# Patient Record
Sex: MALE | Race: WHITE | Employment: FULL TIME | URBAN - METROPOLITAN AREA
[De-identification: names, ages, dates, MRNs, and addresses within clinical notes are randomized per-mention and may not be internally consistent; named-entity substitution may affect disease eponyms.]

---

## 2023-12-10 ENCOUNTER — HOSPITAL ENCOUNTER (OUTPATIENT)
Age: 58
Discharge: HOME | End: 2023-12-10
Payer: COMMERCIAL

## 2023-12-10 VITALS
RESPIRATION RATE: 20 BRPM | DIASTOLIC BLOOD PRESSURE: 78 MMHG | TEMPERATURE: 101 F | SYSTOLIC BLOOD PRESSURE: 121 MMHG | HEART RATE: 117 BPM | OXYGEN SATURATION: 99 %

## 2023-12-10 DIAGNOSIS — Z11.52 ENCOUNTER FOR SCREENING FOR COVID-19: ICD-10-CM

## 2023-12-10 DIAGNOSIS — R05.1 ACUTE COUGH: ICD-10-CM

## 2023-12-10 DIAGNOSIS — U07.1 COVID: Primary | ICD-10-CM

## 2023-12-10 LAB
POCT INFLUENZA A: NEGATIVE
POCT INFLUENZA B: NEGATIVE
SARS-COV-2 RNA RESP QL NAA+PROBE: DETECTED

## 2023-12-10 RX ORDER — IBUPROFEN 600 MG/1
600 TABLET ORAL ONCE
Status: COMPLETED | OUTPATIENT
Start: 2023-12-10 | End: 2023-12-10

## 2023-12-17 ENCOUNTER — HOSPITAL ENCOUNTER (OUTPATIENT)
Age: 58
Discharge: HOME | End: 2023-12-17
Payer: COMMERCIAL

## 2023-12-17 VITALS
OXYGEN SATURATION: 99 % | RESPIRATION RATE: 16 BRPM | SYSTOLIC BLOOD PRESSURE: 140 MMHG | TEMPERATURE: 97 F | HEART RATE: 89 BPM | DIASTOLIC BLOOD PRESSURE: 95 MMHG

## 2023-12-17 DIAGNOSIS — U07.1 COVID-19: ICD-10-CM

## 2023-12-17 DIAGNOSIS — Z11.52 ENCOUNTER FOR SCREENING LABORATORY TESTING FOR COVID-19 VIRUS: Primary | ICD-10-CM

## 2023-12-17 LAB — SARS-COV-2 RNA RESP QL NAA+PROBE: DETECTED

## 2023-12-17 PROCEDURE — U0002 COVID-19 LAB TEST NON-CDC: HCPCS | Performed by: NURSE PRACTITIONER

## 2023-12-17 PROCEDURE — 99213 OFFICE O/P EST LOW 20 MIN: CPT | Performed by: NURSE PRACTITIONER

## 2023-12-17 NOTE — DISCHARGE INSTRUCTIONS
FOLLOW UP WITH YOUR PRIMARY CARE PROVIDER As needed.   -SELF QUARANTINE AND STAY AT HOME UNTIL YOUR SYMPTOMS RESOLVE   CDC recommends 5 days of Quarantine (12/16) then 5 days of masking from positive test (until 12/22)    -[address] YOUR HANDS OFTEN, DISINFECT COMMON SURFACES AROUND HOME THAT OTHERS USE  -COVER YOUR COUGH AND WEAR A MASK!    -DRINK PLENTY OF WATER, GATORADE, EAT SMALL MEALS, WELL BALANCED FOODS  -TAKE TYLENOL 650MG-1000MG TABLET BY MOUTH EVERY 6 HOURS AS NEEDED FOR BODYACHES, PAIN/FEVER > 100.4

## (undated) NOTE — LETTER
Date & Time: 12/17/2023, 8:49 AM  Patient: Sherri Rose  Encounter Provider(s):    DAVIN Rico       To Whom It May Concern:    Sofie Elizabeth was seen and treated in our department on 12/17/2023. He should not return to work until 12/18/2023 and mask for 5 days, until 12/22/2023 .     If you have any questions or concerns, please do not hesitate to call.        _____________________________  Physician/[facility] Signature

## (undated) NOTE — LETTER
Date & Time: 12/10/2023, 8:35 AM  Patient: Stella Montanez  Encounter Provider(s):    Elly Mejia       To Whom It May Concern:    Gin Muñoz was seen and treated in our department on 12/10/2023. He  tested positive for COVID. Please allow him to follow CDC guidelines and isolate for at least the first 5 days .     If you have any questions or concerns, please do not hesitate to call.        _____________________________  Physician/[facility] Signature